# Patient Record
Sex: MALE | Race: WHITE | NOT HISPANIC OR LATINO | URBAN - METROPOLITAN AREA
[De-identification: names, ages, dates, MRNs, and addresses within clinical notes are randomized per-mention and may not be internally consistent; named-entity substitution may affect disease eponyms.]

---

## 2024-01-04 ENCOUNTER — OFFICE VISIT (OUTPATIENT)
Dept: NEUROLOGY | Facility: CLINIC | Age: 69
End: 2024-01-04
Payer: MEDICARE

## 2024-01-04 VITALS
BODY MASS INDEX: 36.57 KG/M2 | WEIGHT: 261.2 LBS | DIASTOLIC BLOOD PRESSURE: 80 MMHG | HEART RATE: 76 BPM | HEIGHT: 71 IN | SYSTOLIC BLOOD PRESSURE: 122 MMHG

## 2024-01-04 DIAGNOSIS — F32.A ANXIETY AND DEPRESSION: Primary | ICD-10-CM

## 2024-01-04 DIAGNOSIS — R41.3 POOR MEMORY: ICD-10-CM

## 2024-01-04 DIAGNOSIS — F41.9 ANXIETY AND DEPRESSION: Primary | ICD-10-CM

## 2024-01-04 PROCEDURE — 1159F MED LIST DOCD IN RCRD: CPT | Performed by: PSYCHIATRY & NEUROLOGY

## 2024-01-04 PROCEDURE — 1160F RVW MEDS BY RX/DR IN RCRD: CPT | Performed by: PSYCHIATRY & NEUROLOGY

## 2024-01-04 PROCEDURE — 1036F TOBACCO NON-USER: CPT | Performed by: PSYCHIATRY & NEUROLOGY

## 2024-01-04 PROCEDURE — 99204 OFFICE O/P NEW MOD 45 MIN: CPT | Performed by: PSYCHIATRY & NEUROLOGY

## 2024-01-04 RX ORDER — ROSUVASTATIN CALCIUM 10 MG/1
10 TABLET, COATED ORAL NIGHTLY
COMMUNITY
Start: 2023-12-24

## 2024-01-04 RX ORDER — MULTIVITAMIN
1 TABLET ORAL DAILY
COMMUNITY

## 2024-01-04 RX ORDER — METFORMIN HYDROCHLORIDE 1000 MG/1
1000 TABLET ORAL 2 TIMES DAILY
COMMUNITY
Start: 2023-12-24

## 2024-01-04 RX ORDER — DONEPEZIL HYDROCHLORIDE 5 MG/1
5 TABLET, FILM COATED ORAL NIGHTLY
COMMUNITY
Start: 2023-12-27

## 2024-01-04 RX ORDER — CLOPIDOGREL BISULFATE 75 MG/1
75 TABLET ORAL DAILY
COMMUNITY

## 2024-01-04 RX ORDER — CHOLECALCIFEROL (VITAMIN D3) 50 MCG
1 TABLET ORAL DAILY
COMMUNITY

## 2024-01-04 RX ORDER — GUAIFENESIN/PHENYLPROPANOLAMIN
500 EXPECTORANT ORAL DAILY
COMMUNITY

## 2024-01-04 RX ORDER — ASPIRIN 81 MG/1
1 TABLET ORAL DAILY
COMMUNITY

## 2024-01-04 ASSESSMENT — ENCOUNTER SYMPTOMS
WOUND: 0
NECK PAIN: 0
FEVER: 0
SINUS PAIN: 0
AGITATION: 0
SPEECH DIFFICULTY: 0
APPETITE CHANGE: 0
FATIGUE: 0
COLOR CHANGE: 0
HALLUCINATIONS: 0
VOICE CHANGE: 0
ABDOMINAL DISTENTION: 0
FLANK PAIN: 0
DYSPHORIC MOOD: 0
NERVOUS/ANXIOUS: 0
LIGHT-HEADEDNESS: 0
HEADACHES: 0
EYE REDNESS: 0
CONSTIPATION: 0
WHEEZING: 0
MYALGIAS: 0
TROUBLE SWALLOWING: 0
NUMBNESS: 0
JOINT SWELLING: 0
ABDOMINAL PAIN: 0
DIAPHORESIS: 0
SHORTNESS OF BREATH: 0
POLYDIPSIA: 0
SORE THROAT: 0
FREQUENCY: 0
DIFFICULTY URINATING: 0
EYE DISCHARGE: 0
NAUSEA: 0
SINUS PRESSURE: 0
PALPITATIONS: 0
DIZZINESS: 0
BLOOD IN STOOL: 0
SEIZURES: 0
FACIAL ASYMMETRY: 0
CHILLS: 0
RECTAL PAIN: 0
DIARRHEA: 0
SLEEP DISTURBANCE: 0
CHOKING: 0
DECREASED CONCENTRATION: 0
COUGH: 0
CONFUSION: 0
HYPERACTIVE: 0
ADENOPATHY: 0
APNEA: 1
VOMITING: 0
ANAL BLEEDING: 0
CHEST TIGHTNESS: 0
BACK PAIN: 0
EYE PAIN: 0
DYSURIA: 0
ARTHRALGIAS: 0
BRUISES/BLEEDS EASILY: 0
NECK STIFFNESS: 0
EYE ITCHING: 0
STRIDOR: 0
ACTIVITY CHANGE: 0

## 2024-01-04 NOTE — PROGRESS NOTES
Consults    Chief complaint: Doing I have dementia?    History Of Present Illness  Zeferino Mario is a 68 y.o. male presenting with chief complaints of poor working memory and poor attention span poor work choices.  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  After his wife passed away in 2013 he waited for 2 years then he got  to another woman who came to my office today along with the patient and border concern like why his memory is so poor for daily life things that he misplaces things and then he is start searching for his pen keys paperwork his thoughts are not very well-organized and sometimes he has sudden outburst at his wife and it is affecting their relationship.  There is a background to the patient's underlying depression which she did not admit but it seems that he is under a lot of mental pressure and depression because of his relationship with his children children are around the ages of 50 they have grown but they are still relying dependent on the father.  The patient and his second wife both have worked very hard in the past 7 years and created more businesses and made some more assets and the children are asking the patient to sell and the property cash or what ever they want there is some demands and this is causing a lot of tension in the whole family.    Patient is a quite person most of the interview he was not talking till I did the MoCA after the MoCA and noted that he is but this patient is not very good he is not participating very well.  We will repeat the MoCA test in 3 months.  It took me more than 80 minutes to complete this whole encounter because patient would give me 1 or 2 or 3 sentence answers it would not give me details till I started talking about his relationship with his family then he started talking.  Organized thoughts and  he knew what he is talking but he would not talk much about it and when I ask him if he feels depressed or anxious about it he denied that.    Past Medical and Surgical History  Relationship issues in the family with the children        Past Surgical History:   Procedure Laterality Date    MR HEAD ANGIO WO IV CONTRAST  4/26/2022    MR HEAD ANGIO WO IV CONTRAST 4/26/2022 [facility]    MR NECK ANGIO WO IV CONTRAST  4/26/2022    MR NECK ANGIO WO IV CONTRAST 4/26/2022 [facility]        Social History  Social History     Tobacco Use    Smoking status: Former     Types: Cigarettes    Smokeless tobacco: Never   Substance Use Topics    Alcohol use: Not Currently    Drug use: Never     Allergies  Patient has no known allergies.  (Not in a hospital admission)      Review of Systems   Constitutional:  Negative for activity change, appetite change, chills, diaphoresis, fatigue and fever.   HENT:  Negative for congestion, dental problem, drooling, ear discharge, sinus pressure, sinus pain, sneezing, sore throat, tinnitus, trouble swallowing and voice change.    Eyes:  Negative for pain, discharge, redness and itching.   Respiratory:  Positive for apnea. Negative for cough, choking, chest tightness, shortness of breath, wheezing and stridor.    Cardiovascular:  Negative for chest pain, palpitations and leg swelling.   Gastrointestinal:  Negative for abdominal distention, abdominal pain, anal bleeding, blood in stool, constipation, diarrhea, nausea, rectal pain and vomiting.   Endocrine: Negative for cold intolerance, heat intolerance and polydipsia.   Genitourinary:  Negative for difficulty urinating, dysuria, enuresis, flank pain, frequency and genital sores.   Musculoskeletal:  Negative for arthralgias, back pain, gait problem, joint swelling, myalgias, neck pain and neck stiffness.   Skin:  Negative for color change, pallor, rash and wound.   Allergic/Immunologic: Negative for environmental allergies, food  "allergies and immunocompromised state.   Neurological:  Negative for dizziness, seizures, facial asymmetry, speech difficulty, light-headedness, numbness and headaches.   Hematological:  Negative for adenopathy. Does not bruise/bleed easily.   Psychiatric/Behavioral:  Negative for agitation, behavioral problems, confusion, decreased concentration, dysphoric mood, hallucinations, self-injury, sleep disturbance and suicidal ideas. The patient is not nervous/anxious and is not hyperactive.          Cardiovascular system: S1-S2 intact  Respiratory clear to auscultation bilateral on deep breathing he feels irritability on the left side of the chest.    Neurological Exam      Last Recorded Vitals  Blood pressure 122/80, pulse 76, height 1.803 m (5' 11\"), weight 118 kg (261 lb 3.2 oz).    Relevant Results      Current Outpatient Medications:     donepezil (Aricept) 5 mg tablet, Take 1 tablet (5 mg) by mouth once daily at bedtime., Disp: , Rfl:     metFORMIN (Glucophage) 1,000 mg tablet, Take 1 tablet (1,000 mg) by mouth 2 times a day., Disp: , Rfl:     rosuvastatin (Crestor) 10 mg tablet, Take 1 tablet (10 mg) by mouth once daily at bedtime. for cholesterol, Disp: , Rfl:     aspirin 81 mg EC tablet, Take 1 tablet (81 mg) by mouth once daily., Disp: , Rfl:     cholecalciferol (Thera-D) 50 MCG (2000 UT) tablet, Take 1 tablet (2,000 Units) by mouth once daily., Disp: , Rfl:     clopidogrel (Plavix) 75 mg tablet, Take 1 tablet (75 mg) by mouth once daily., Disp: , Rfl:     multivitamin tablet, Take 1 tablet by mouth once daily., Disp: , Rfl:     saw palmetto 500 mg capsule, Take 1 capsule (500 mg) by mouth once daily., Disp: , Rfl:      Continuous medications    PRN medications, reviewed    Assessment/Plan   Mood disorder seems to be a major issue however I am concerned about overlapping of the 2 conditions:  Pseudodementia versus mild dementia  Underlying mood disorder needs to be addressed by psychologist, " psychiatrist  Neuropsychology evaluation to differentiate pseudodementia from mild cognitive impairment or early dementia  As family history is also positive for dementia  MOCA today 9/30, poor participation or effort.= seems to be playing a role.  MoCA test to be repeated in 3 months  Advised sleep Apnea screening and treatment, he is not interested  Patient/Family Education: Extensive time was spent educating the patient on relevant anatomy, clinical findings and imaging, as well as discussing the potential diagnoses as discussed above.  Pharmacology: as above. Exercise: I discussed the importance of maintaining a daily exercise program, including stretching and strengthening. Preventative strategies were reviewed, specifically avoidance of any exercises that exacerbate pain.Return to online virtual visit/ clinic visit for follow-up with RODRIGO Cain in 12 weeks or sooner as needed.The patient expressed understanding and agreement with the assessment and plan.  Patient encouraged to contact us should they have any questions, concerns, or any changes in symptoms. Thank you for allowing me to participate in the care of your patient.** This note is created using speech recognition transcription software. Despite proofreading, several typographical errors might be present that might affect the meaning of the content. Please call with any questions.**          Ricky Hardy MD